# Patient Record
Sex: MALE | Race: BLACK OR AFRICAN AMERICAN | Employment: STUDENT | URBAN - METROPOLITAN AREA
[De-identification: names, ages, dates, MRNs, and addresses within clinical notes are randomized per-mention and may not be internally consistent; named-entity substitution may affect disease eponyms.]

---
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
## 2022-11-10 ENCOUNTER — COMPREHENSIVE EXAM (OUTPATIENT)
Dept: URBAN - METROPOLITAN AREA CLINIC 46 | Facility: CLINIC | Age: 22
End: 2022-11-10

## 2022-11-10 DIAGNOSIS — H52.7: ICD-10-CM

## 2022-11-10 PROCEDURE — 92014 COMPRE OPH EXAM EST PT 1/>: CPT

## 2022-11-10 PROCEDURE — 92015 DETERMINE REFRACTIVE STATE: CPT

## 2022-11-10 ASSESSMENT — VISUAL ACUITY
OS_CC: J3
OS_SC: 20/30
OD_PH: 20/40
OD_SC: 20/200
OD_CC: J3

## 2023-11-13 ENCOUNTER — PREPPED CHART (OUTPATIENT)
Dept: URBAN - METROPOLITAN AREA CLINIC 46 | Facility: CLINIC | Age: 23
End: 2023-11-13

## 2024-11-16 ENCOUNTER — COMPREHENSIVE EXAM (OUTPATIENT)
Dept: URBAN - METROPOLITAN AREA CLINIC 46 | Facility: CLINIC | Age: 24
End: 2024-11-16

## 2024-11-16 DIAGNOSIS — H52.7: ICD-10-CM

## 2024-11-16 PROCEDURE — 92015 DETERMINE REFRACTIVE STATE: CPT

## 2024-11-16 PROCEDURE — 92014 COMPRE OPH EXAM EST PT 1/>: CPT
